# Patient Record
Sex: FEMALE | Race: OTHER | HISPANIC OR LATINO
[De-identification: names, ages, dates, MRNs, and addresses within clinical notes are randomized per-mention and may not be internally consistent; named-entity substitution may affect disease eponyms.]

---

## 2018-02-21 ENCOUNTER — RESULT REVIEW (OUTPATIENT)
Age: 68
End: 2018-02-21

## 2019-01-28 ENCOUNTER — RESULT REVIEW (OUTPATIENT)
Age: 69
End: 2019-01-28

## 2019-10-16 PROBLEM — Z00.00 ENCOUNTER FOR PREVENTIVE HEALTH EXAMINATION: Status: ACTIVE | Noted: 2019-10-16

## 2019-11-27 ENCOUNTER — OUTPATIENT (OUTPATIENT)
Dept: OUTPATIENT SERVICES | Facility: HOSPITAL | Age: 69
LOS: 1 days | Discharge: HOME | End: 2019-11-27
Payer: MEDICARE

## 2019-11-27 VITALS
OXYGEN SATURATION: 99 % | WEIGHT: 111.33 LBS | SYSTOLIC BLOOD PRESSURE: 136 MMHG | DIASTOLIC BLOOD PRESSURE: 64 MMHG | HEIGHT: 57 IN | RESPIRATION RATE: 17 BRPM | TEMPERATURE: 98 F | HEART RATE: 56 BPM

## 2019-11-27 DIAGNOSIS — Z90.711 ACQUIRED ABSENCE OF UTERUS WITH REMAINING CERVICAL STUMP: Chronic | ICD-10-CM

## 2019-11-27 DIAGNOSIS — Z01.818 ENCOUNTER FOR OTHER PREPROCEDURAL EXAMINATION: ICD-10-CM

## 2019-11-27 DIAGNOSIS — N81.12 CYSTOCELE, LATERAL: Chronic | ICD-10-CM

## 2019-11-27 DIAGNOSIS — M17.11 UNILATERAL PRIMARY OSTEOARTHRITIS, RIGHT KNEE: ICD-10-CM

## 2019-11-27 LAB
ANION GAP SERPL CALC-SCNC: 5 MMOL/L — SIGNIFICANT CHANGE UP (ref 5–17)
APTT BLD: 27.6 SEC — SIGNIFICANT CHANGE UP (ref 27.5–36.3)
BASOPHILS # BLD AUTO: 0.03 K/UL — SIGNIFICANT CHANGE UP (ref 0–0.2)
BASOPHILS NFR BLD AUTO: 0.6 % — SIGNIFICANT CHANGE UP (ref 0–2)
BLD GP AB SCN SERPL QL: SIGNIFICANT CHANGE UP
BUN SERPL-MCNC: 11 MG/DL — SIGNIFICANT CHANGE UP (ref 7–23)
CALCIUM SERPL-MCNC: 9.3 MG/DL — SIGNIFICANT CHANGE UP (ref 8.5–10.1)
CHLORIDE SERPL-SCNC: 107 MMOL/L — SIGNIFICANT CHANGE UP (ref 96–108)
CO2 SERPL-SCNC: 30 MMOL/L — SIGNIFICANT CHANGE UP (ref 22–31)
CREAT SERPL-MCNC: 0.58 MG/DL — SIGNIFICANT CHANGE UP (ref 0.5–1.3)
EOSINOPHIL # BLD AUTO: 0.1 K/UL — SIGNIFICANT CHANGE UP (ref 0–0.5)
EOSINOPHIL NFR BLD AUTO: 2.1 % — SIGNIFICANT CHANGE UP (ref 0–6)
GLUCOSE SERPL-MCNC: 87 MG/DL — SIGNIFICANT CHANGE UP (ref 70–99)
HBA1C BLD-MCNC: 5.6 % — SIGNIFICANT CHANGE UP (ref 4–5.6)
HCT VFR BLD CALC: 37.2 % — SIGNIFICANT CHANGE UP (ref 34.5–45)
HGB BLD-MCNC: 11.8 G/DL — SIGNIFICANT CHANGE UP (ref 11.5–15.5)
IMM GRANULOCYTES NFR BLD AUTO: 0.2 % — SIGNIFICANT CHANGE UP (ref 0–1.5)
INR BLD: 1.03 RATIO — SIGNIFICANT CHANGE UP (ref 0.88–1.16)
LYMPHOCYTES # BLD AUTO: 1.66 K/UL — SIGNIFICANT CHANGE UP (ref 1–3.3)
LYMPHOCYTES # BLD AUTO: 35.2 % — SIGNIFICANT CHANGE UP (ref 13–44)
MCHC RBC-ENTMCNC: 30.1 PG — SIGNIFICANT CHANGE UP (ref 27–34)
MCHC RBC-ENTMCNC: 31.7 GM/DL — LOW (ref 32–36)
MCV RBC AUTO: 94.9 FL — SIGNIFICANT CHANGE UP (ref 80–100)
MONOCYTES # BLD AUTO: 0.36 K/UL — SIGNIFICANT CHANGE UP (ref 0–0.9)
MONOCYTES NFR BLD AUTO: 7.6 % — SIGNIFICANT CHANGE UP (ref 2–14)
NEUTROPHILS # BLD AUTO: 2.56 K/UL — SIGNIFICANT CHANGE UP (ref 1.8–7.4)
NEUTROPHILS NFR BLD AUTO: 54.3 % — SIGNIFICANT CHANGE UP (ref 43–77)
NRBC # BLD: 0 /100 WBCS — SIGNIFICANT CHANGE UP (ref 0–0)
PLATELET # BLD AUTO: 315 K/UL — SIGNIFICANT CHANGE UP (ref 150–400)
POTASSIUM SERPL-MCNC: 4.3 MMOL/L — SIGNIFICANT CHANGE UP (ref 3.5–5.3)
POTASSIUM SERPL-SCNC: 4.3 MMOL/L — SIGNIFICANT CHANGE UP (ref 3.5–5.3)
PROTHROM AB SERPL-ACNC: 11.5 SEC — SIGNIFICANT CHANGE UP (ref 10–12.9)
RBC # BLD: 3.92 M/UL — SIGNIFICANT CHANGE UP (ref 3.8–5.2)
RBC # FLD: 13 % — SIGNIFICANT CHANGE UP (ref 10.3–14.5)
SODIUM SERPL-SCNC: 142 MMOL/L — SIGNIFICANT CHANGE UP (ref 135–145)
WBC # BLD: 4.72 K/UL — SIGNIFICANT CHANGE UP (ref 3.8–10.5)
WBC # FLD AUTO: 4.72 K/UL — SIGNIFICANT CHANGE UP (ref 3.8–10.5)

## 2019-11-27 PROCEDURE — 93010 ELECTROCARDIOGRAM REPORT: CPT

## 2019-11-27 RX ORDER — METOPROLOL TARTRATE 50 MG
1 TABLET ORAL
Qty: 0 | Refills: 0 | DISCHARGE

## 2019-11-27 NOTE — H&P PST ADULT - HISTORY OF PRESENT ILLNESS
history of hypertesnion, prolapse baldder 69 year old with a past medical history of hypertension, prolapse bladder, paravaginal repair and cystoscopy c/o of pain to right knee.  She is scheduled for a right total knee replacement on 12/11/19.     Goal: to be more active

## 2019-11-27 NOTE — H&P PST ADULT - NSANTHOSAYNRD_GEN_A_CORE
No. SEFERINO screening performed.  STOP BANG Legend: 0-2 = LOW Risk; 3-4 = INTERMEDIATE Risk; 5-8 = HIGH Risk

## 2019-11-27 NOTE — PHYSICAL THERAPY INITIAL EVALUATION ADULT - MODIFIED CLINICAL TEST OF SENSORY INTEGRATION IN BALANCE TEST
5x Sit to Stand Test = (no hands use) 18.84 seconds, indicating significant impairment c functional mobility & strength  ; 2 Minute Walk Test = 400 feet without devices or rest stops

## 2019-11-27 NOTE — H&P PST ADULT - NSICDXPASTSURGICALHX_GEN_ALL_CORE_FT
PAST SURGICAL HISTORY:  History of partial hysterectomy PAST SURGICAL HISTORY:  History of partial hysterectomy     Paravaginal cystocele

## 2019-11-27 NOTE — H&P PST ADULT - NSICDXPROBLEM_GEN_ALL_CORE_FT
PROBLEM DIAGNOSES  Problem: Osteoarthritis of right knee  Assessment and Plan: right knee replacement 12/11/19    Problem: Preop examination  Assessment and Plan: labs - cbc,pt/ptt,bmp,t&s,nose cx,ekg  M/C required  preop 3 day hibiclens instruction reviewed and given .instructed on if  nose cx positive use mupuricin 5 days and checklist given  take routine meds DOS with sips of water. avoid NSAID and OTC supplements. verbalized understanding  information on proper nutrition , increase protein and better food choices provided in packet

## 2019-11-27 NOTE — H&P PST ADULT - ASSESSMENT
69 year old with a past medical history of hypertension, prolapse bladder, paravaginal repair and cystoscopy c/o of pain to right knee.  She is scheduled for a right total knee replacement on 19.     CAPRINI SCORE [CLOT]    AGE RELATED RISK FACTORS                                                       MOBILITY RELATED FACTORS  [ ] Age 41-60 years                                            (1 Point)                  [ ] Bed rest                                                        (1 Point)  [x ] Age: 61-74 years                                           (2 Points)                 [ ] Plaster cast                                                   (2 Points)  [ ] Age= 75 years                                              (3 Points)                 [ ] Bed bound for more than 72 hours                 (2 Points)    DISEASE RELATED RISK FACTORS                                               GENDER SPECIFIC FACTORS  [ ] Edema in the lower extremities                       (1 Point)                  [ ] Pregnancy                                                     (1 Point)  [ ] Varicose veins                                               (1 Point)                  [ ] Post-partum < 6 weeks                                   (1 Point)             [ ] BMI > 25 Kg/m2                                            (1 Point)                  [ ] Hormonal therapy  or oral contraception          (1 Point)                 [ ] Sepsis (in the previous month)                        (1 Point)                  [ ] History of pregnancy complications                 (1 point)  [ ] Pneumonia or serious lung disease                                               [ ] Unexplained or recurrent                     (1 Point)           (in the previous month)                               (1 Point)  [ ] Abnormal pulmonary function test                     (1 Point)                 SURGERY RELATED RISK FACTORS  [ ] Acute myocardial infarction                              (1 Point)                 [ ]  Section                                             (1 Point)  [ ] Congestive heart failure (in the previous month)  (1 Point)               [ ] Minor surgery                                                  (1 Point)   [ ] Inflammatory bowel disease                             (1 Point)                 [ ] Arthroscopic surgery                                        (2 Points)  [ ] Central venous access                                      (2 Points)                [ ] General surgery lasting more than 45 minutes   (2 Points)       [ ] Stroke (in the previous month)                          (5 Points)               [ x] Elective arthroplasty                                         (5 Points)                                                                                                                                               HEMATOLOGY RELATED FACTORS                                                 TRAUMA RELATED RISK FACTORS  [ ] Prior episodes of VTE                                     (3 Points)                [ ] Fracture of the hip, pelvis, or leg                       (5 Points)  [ ] Positive family history for VTE                         (3 Points)                 [ ] Acute spinal cord injury (in the previous month)  (5 Points)  [ ] Prothrombin 90346 A                                     (3 Points)                 [ ] Paralysis  (less than 1 month)                             (5 Points)  [ ] Factor V Leiden                                             (3 Points)                  [ ] Multiple Trauma within 1 month                        (5 Points)  [ ] Lupus anticoagulants                                     (3 Points)                                                           [ ] Anticardiolipin antibodies                               (3 Points)                                                       [ ] High homocysteine in the blood                      (3 Points)                                             [ ] Other congenital or acquired thrombophilia      (3 Points)                                                [ ] Heparin induced thrombocytopenia                  (3 Points)                                          Total Score [      7    ]    Caprini Score 0 - 2:  Low Risk, No VTE Prophylaxis required for most patients, encourage ambulation  Caprini Score 3 - 6:  At Risk, pharmacologic VTE prophylaxis is indicated for most patients (in the absence of a contraindication)  Caprini Score Greater than or = 7:  High Risk, pharmacologic VTE prophylaxis is indicated for most patients (in the absence of a contraindication)

## 2019-11-27 NOTE — PHYSICAL THERAPY INITIAL EVALUATION ADULT - ADDITIONAL COMMENTS
Pt lives in a private home with 5 steps to enter with B handrails that are wide apart. Once inside there are 5 more steps to the main level with 1 handrail to right to main level. Pt has a walk in shower in his bedroom with grab bars and there is a tub shower guest bathroom also with grab bars. Pt has a fixed shower head, standard height toilet. Pt reports that a 3:1 commode can fit over toilet. (+) buckling, (-) falls. Pt does not have dme. Pain is 0/10 at rest and can increase to 6/10 with static positions, stairs and ambulation. Pt gets relief with ice, does not take pain medications, no recent PT. Pt has had complications  with anesthesia. Pt wears glasses, R hand dominant, drives, no hearing aids.

## 2019-11-28 LAB
MRSA PCR RESULT.: SIGNIFICANT CHANGE UP
S AUREUS DNA NOSE QL NAA+PROBE: SIGNIFICANT CHANGE UP

## 2019-12-26 PROBLEM — N81.10 CYSTOCELE, UNSPECIFIED: Chronic | Status: ACTIVE | Noted: 2019-11-27

## 2019-12-26 PROBLEM — I10 ESSENTIAL (PRIMARY) HYPERTENSION: Chronic | Status: ACTIVE | Noted: 2019-11-27

## 2020-01-06 ENCOUNTER — APPOINTMENT (OUTPATIENT)
Dept: UROGYNECOLOGY | Facility: CLINIC | Age: 70
End: 2020-01-06
Payer: MEDICARE

## 2020-01-06 VITALS
SYSTOLIC BLOOD PRESSURE: 120 MMHG | BODY MASS INDEX: 24.16 KG/M2 | WEIGHT: 112 LBS | DIASTOLIC BLOOD PRESSURE: 60 MMHG | HEIGHT: 57 IN

## 2020-01-06 DIAGNOSIS — N93.9 ABNORMAL UTERINE AND VAGINAL BLEEDING, UNSPECIFIED: ICD-10-CM

## 2020-01-06 PROCEDURE — 99204 OFFICE O/P NEW MOD 45 MIN: CPT

## 2020-01-06 NOTE — LETTER BODY
[Dear  ___] : Dear  [unfilled], [DrDallas  ___] : Dr. ERAZO  [Thank you very much for allowing me to participate in the care of this patient. If you have any questions, please do not hesitate to contact me] : Thank you very much for allowing me to participate in the care of this patient. If you have any questions, please do not hesitate to contact me. [Attached please find my note.] : Attached please find my note.

## 2020-09-15 ENCOUNTER — APPOINTMENT (OUTPATIENT)
Dept: UROGYNECOLOGY | Facility: CLINIC | Age: 70
End: 2020-09-15
Payer: MEDICARE

## 2020-09-15 VITALS — HEART RATE: 77 BPM | SYSTOLIC BLOOD PRESSURE: 116 MMHG | DIASTOLIC BLOOD PRESSURE: 67 MMHG | TEMPERATURE: 98.8 F

## 2020-09-15 DIAGNOSIS — Z87.898 PERSONAL HISTORY OF OTHER SPECIFIED CONDITIONS: ICD-10-CM

## 2020-09-15 LAB
BILIRUB UR QL STRIP: NORMAL
CLARITY UR: CLEAR
COLLECTION METHOD: NORMAL
GLUCOSE UR-MCNC: NORMAL
HCG UR QL: 0.2 EU/DL
HGB UR QL STRIP.AUTO: NORMAL
KETONES UR-MCNC: NORMAL
LEUKOCYTE ESTERASE UR QL STRIP: NORMAL
NITRITE UR QL STRIP: NORMAL
PH UR STRIP: 5
PROT UR STRIP-MCNC: NORMAL
SP GR UR STRIP: 1.01

## 2020-09-15 PROCEDURE — 99213 OFFICE O/P EST LOW 20 MIN: CPT

## 2020-09-15 RX ORDER — LEVOFLOXACIN 500 MG/1
500 TABLET, FILM COATED ORAL DAILY
Qty: 7 | Refills: 0 | Status: ACTIVE | COMMUNITY
Start: 2020-09-15 | End: 1900-01-01

## 2020-09-15 RX ORDER — AMOXICILLIN AND CLAVULANATE POTASSIUM 500; 125 MG/1; MG/1
500-125 TABLET, FILM COATED ORAL
Qty: 20 | Refills: 0 | Status: DISCONTINUED | COMMUNITY
Start: 2020-09-15 | End: 2020-09-15

## 2020-09-15 NOTE — HISTORY OF PRESENT ILLNESS
[FreeTextEntry1] : Patient with complicated urogyn history. At present has persistent vaginal bleeding with denuded mucosal surfaces in absence of detectable vaginal erosions.   Surgical history includes (all Dr Adkins)\par \par  2010: supracervical hysterectomy, abdominal sacral suspension\par \par 5/16/2013:  Laparotomy: resection of sacral suspension mesh: erosion posterior and at apex noted. New AMS prolene graft placed anterior wall only ' away from repaired colpotomy - sewn with 2-0 prolene- sacral attachment with pro-brianna. \par \par 7/11/2019:  Closure of vaginal apex for bleeding/ denuded surface of apical mucosa, posterior repair. Findings were denuded surface of vaginal epithelium without evidence of erosion of previous graft.\par \par 10/22/19 : Graft augmented neovagina anterior and posterior defects grafted with dermapure.  Posterior was most of posterior length\par \par Moderate persistent vaginal bleeding is chief complaint.  Bopsies at time of  most recent surgery - benign. \par \par Examination the external genitalia are normal. Introitus is snug from the muscular constriction viewpoint. She tolerate speculum examination of opening the speculum there is denuded vaginal mucosa present for the upper half of the vagina.  It is friable and bleeds easily with touch. By palpation and visualization. I cannot appreciate any permanent suture or graft material.  With a swab I expelled a small clot. i do not appreciate any foreign body or lesion. \par \par \par My impression is that this patient has a chronic inflammatory response following several surgeries and at present appears to have denuded a good fraction of the vaginal mucosa.   \par \par The options include, exam under anesthesia, CO2 laser, and neovagina with autologous graft.    I have asked her to have a consult with Lashaun Wright in [place].  If Co2 is not an option then a specialty center for karlene-vagina's would be my recommendation.  It may be prudent regardless of next proposed step to have an EUA to verify there is no foreign material in the vaginal lumen.

## 2020-09-15 NOTE — HISTORY OF PRESENT ILLNESS
[FreeTextEntry1] : This is a 70-year-old woman with a complicated urogynecologic history. Please see January 2020 initial note. She has had multiple reconstructive surgeries. Some with a synthetic mesh and some with a Dermapure. She has chronic mild vaginal bleeding, and denudation of the vaginal mucosa.  She does not appear clinically to have recurrent infections.  Previous referral was made to Lashaun Wright and consideration of referral for neovagina if symptoms dictated.  EUA is a consideration\par \par She has hesitancy and UTI symptoms\par \par Sterile straight catheterization was performed to measure a postvoid residual volume which was 30 cc\par Urine has trace blood only. \par \par Examination has shown genitalia are normal. There's no evidence of prolapse. His denudation of the vaginal mucosa for the upper half. I cannot palpate nor see foreign body. However, there is discharge in the tissue was friable. There was bleeding with touch. The bleeding stopped with pressure. I cultured what appeared to be a discharge.\par \par Then, my impression that she is infection of micro-particle of perhaps material used for vaginal suspension.  I'm recommending Augmentin for 10 days and stop the medication, pending results of the culture but she will started based on clinical symptoms and the appearance of infection today.  \par \par She has had a telehealth consult with Dr. Wright and is scheduled for an in person visit to see whether possible laser treatment of the vagina as an option.  Certainly, minimizing under eliminating, infection, prior to that visit, and extents. The patient will call me after the Augmentin when her symptoms changed at all.\par \par I have counseled at managing this with antibiotics and accepting  some vaginal staining is a risk level far lower than extensive surgery to attempt to remove all foreign material.  that procedure also has risk of recurrent prolapse. \par \par

## 2020-09-22 LAB — BACTERIA GENITAL AEROBE CULT: NORMAL

## 2020-10-02 ENCOUNTER — RESULT REVIEW (OUTPATIENT)
Age: 70
End: 2020-10-02

## 2020-12-23 PROBLEM — Z87.898 HISTORY OF VAGINAL INFECTION: Status: RESOLVED | Noted: 2020-09-15 | Resolved: 2020-12-23

## 2022-04-15 ENCOUNTER — APPOINTMENT (OUTPATIENT)
Dept: ORTHOPEDIC SURGERY | Facility: CLINIC | Age: 72
End: 2022-04-15

## 2022-12-16 ENCOUNTER — OFFICE (OUTPATIENT)
Dept: URBAN - METROPOLITAN AREA CLINIC 93 | Facility: CLINIC | Age: 72
Setting detail: OPHTHALMOLOGY
End: 2022-12-16
Payer: MEDICARE

## 2022-12-16 VITALS — BODY MASS INDEX: 21.6 KG/M2 | WEIGHT: 110 LBS | HEIGHT: 60 IN

## 2022-12-16 DIAGNOSIS — H35.033: ICD-10-CM

## 2022-12-16 DIAGNOSIS — H00.11: ICD-10-CM

## 2022-12-16 DIAGNOSIS — H43.393: ICD-10-CM

## 2022-12-16 PROCEDURE — 92014 COMPRE OPH EXAM EST PT 1/>: CPT | Performed by: OPHTHALMOLOGY

## 2022-12-16 PROCEDURE — 92250 FUNDUS PHOTOGRAPHY W/I&R: CPT | Performed by: OPHTHALMOLOGY

## 2022-12-16 ASSESSMENT — REFRACTION_CURRENTRX
OS_VPRISM_DIRECTION: PROGS
OS_CYLINDER: -1.25
OD_CYLINDER: -0.75
OS_ADD: +2.50
OD_ADD: +2.50
OD_AXIS: 132
OS_SPHERE: +2.25
OS_OVR_VA: 20/
OD_OVR_VA: 20/
OD_VPRISM_DIRECTION: PROGS
OD_SPHERE: +1.75
OS_AXIS: 75

## 2022-12-16 ASSESSMENT — REFRACTION_MANIFEST
OS_SPHERE: +1.25
OS_AXIS: 58
OD_CYLINDER: -1.00
OD_ADD: +2.75
OS_ADD: +2.50
OD_SPHERE: +0.75
OD_VA1: 20/20
OS_CYLINDER: -1.00
OS_AXIS: 65
OS_VA1: 20/20
OD_AXIS: 150
OD_CYLINDER: -0.75
OD_SPHERE: +1.00
OS_CYLINDER: -1.25
OD_ADD: +2.50
OS_VA1: 20/25
OD_VA1: 20/20
OS_SPHERE: +1.00
OS_ADD: +2.75
OD_AXIS: 120

## 2022-12-16 ASSESSMENT — SPHEQUIV_DERIVED
OS_SPHEQUIV: 0.625
OD_SPHEQUIV: 0.375
OS_SPHEQUIV: 0.5
OD_SPHEQUIV: 0.5
OD_SPHEQUIV: 1.125
OS_SPHEQUIV: 1.125

## 2022-12-16 ASSESSMENT — KERATOMETRY
OD_K1POWER_DIOPTERS: 43.00
OS_K2POWER_DIOPTERS: 43.50
OD_AXISANGLE_DEGREES: 126
OS_K1POWER_DIOPTERS: 43.00
OD_K2POWER_DIOPTERS: 42.50
OS_AXISANGLE_DEGREES: 127

## 2022-12-16 ASSESSMENT — REFRACTION_AUTOREFRACTION
OS_SPHERE: +2.00
OS_CYLINDER: -1.75
OD_SPHERE: +1.75
OD_CYLINDER: -1.25
OS_AXIS: 63
OD_AXIS: 95

## 2022-12-16 ASSESSMENT — AXIALLENGTH_DERIVED
OS_AL: 23.2504
OD_AL: 23.6729
OD_AL: 23.4302
OS_AL: 23.4412
OS_AL: 23.4894
OD_AL: 23.722

## 2022-12-16 ASSESSMENT — VISUAL ACUITY
OS_BCVA: 20/30
OD_BCVA: 20/30

## 2022-12-16 ASSESSMENT — CONFRONTATIONAL VISUAL FIELD TEST (CVF)
OD_FINDINGS: FULL
OS_FINDINGS: FULL

## 2023-06-15 ENCOUNTER — OFFICE (OUTPATIENT)
Facility: LOCATION | Age: 73
Setting detail: OPHTHALMOLOGY
End: 2023-06-15
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
## 2023-06-15 PROCEDURE — NO SHOW FE NO SHOW FEE: Performed by: OPHTHALMOLOGY

## 2023-06-19 ENCOUNTER — APPOINTMENT (OUTPATIENT)
Dept: ORTHOPEDIC SURGERY | Facility: CLINIC | Age: 73
End: 2023-06-19

## 2023-07-18 ENCOUNTER — APPOINTMENT (OUTPATIENT)
Dept: ORTHOPEDIC SURGERY | Facility: CLINIC | Age: 73
End: 2023-07-18
Payer: MEDICARE

## 2023-07-18 VITALS — HEIGHT: 57 IN

## 2023-07-18 DIAGNOSIS — I10 ESSENTIAL (PRIMARY) HYPERTENSION: ICD-10-CM

## 2023-07-18 PROCEDURE — 73562 X-RAY EXAM OF KNEE 3: CPT | Mod: RT

## 2023-07-18 PROCEDURE — 99214 OFFICE O/P EST MOD 30 MIN: CPT

## 2023-07-18 NOTE — IMAGING
[de-identified] : RIGHT KNEE\par -2 and flexes to 100\par varus deformity only partially correctable\par good pulse\par no edema\par medial joint line tenderness\par lateral joint line tenderness

## 2023-07-18 NOTE — ASSESSMENT
[FreeTextEntry1] : 7/18/23: Adv bone on bone arthritis. Has tried and failed all forms of conservative tx. Delayed surgery in the past due to other surgeries/family issues but is ready to proceed with R TKA with ALLEGRA- risks and benefits explained.

## 2023-07-18 NOTE — HISTORY OF PRESENT ILLNESS
[8] : 8 [6] : 6 [Dull/Aching] : dull/aching [de-identified] : 7/18/23: Here to f/up RT Knee pain. Pain worsens with prolonged sitting and going from sit to stand. Not taking any medications for pain. Recieved csi inj in the past 2x, only provided relief the first time. Wears knee brace occasionally when pain is very bad. Had R TKA scheduled pre-COVID but was cancelled. Pt is interested in discussing TKA today.  [FreeTextEntry5] : pain in the right knee has gotten worse was supposed to get sx but was postponed pain worse walking, sitting, stairs

## 2023-07-23 ENCOUNTER — FORM ENCOUNTER (OUTPATIENT)
Age: 73
End: 2023-07-23

## 2023-07-24 ENCOUNTER — APPOINTMENT (OUTPATIENT)
Dept: CT IMAGING | Facility: CLINIC | Age: 73
End: 2023-07-24
Payer: MEDICARE

## 2023-07-24 PROCEDURE — 76376 3D RENDER W/INTRP POSTPROCES: CPT | Mod: RT

## 2023-07-24 PROCEDURE — 73700 CT LOWER EXTREMITY W/O DYE: CPT | Mod: RT

## 2023-08-11 ENCOUNTER — NON-APPOINTMENT (OUTPATIENT)
Age: 73
End: 2023-08-11

## 2023-09-01 ENCOUNTER — OUTPATIENT (OUTPATIENT)
Dept: OUTPATIENT SERVICES | Facility: HOSPITAL | Age: 73
LOS: 1 days | Discharge: HOME | End: 2023-09-01
Payer: MEDICARE

## 2023-09-01 VITALS
RESPIRATION RATE: 17 BRPM | OXYGEN SATURATION: 99 % | SYSTOLIC BLOOD PRESSURE: 136 MMHG | DIASTOLIC BLOOD PRESSURE: 60 MMHG | HEART RATE: 69 BPM | HEIGHT: 57 IN | TEMPERATURE: 98 F | WEIGHT: 110.23 LBS

## 2023-09-01 DIAGNOSIS — M25.561 PAIN IN RIGHT KNEE: ICD-10-CM

## 2023-09-01 DIAGNOSIS — M17.11 UNILATERAL PRIMARY OSTEOARTHRITIS, RIGHT KNEE: ICD-10-CM

## 2023-09-01 DIAGNOSIS — I10 ESSENTIAL (PRIMARY) HYPERTENSION: ICD-10-CM

## 2023-09-01 DIAGNOSIS — N81.12 CYSTOCELE, LATERAL: Chronic | ICD-10-CM

## 2023-09-01 DIAGNOSIS — Z01.818 ENCOUNTER FOR OTHER PREPROCEDURAL EXAMINATION: ICD-10-CM

## 2023-09-01 DIAGNOSIS — Z90.711 ACQUIRED ABSENCE OF UTERUS WITH REMAINING CERVICAL STUMP: Chronic | ICD-10-CM

## 2023-09-01 LAB
A1C WITH ESTIMATED AVERAGE GLUCOSE RESULT: 5.4 % — SIGNIFICANT CHANGE UP (ref 4–5.6)
ALBUMIN SERPL ELPH-MCNC: 3.9 G/DL — SIGNIFICANT CHANGE UP (ref 3.3–5)
ALP SERPL-CCNC: 79 U/L — SIGNIFICANT CHANGE UP (ref 40–120)
ALT FLD-CCNC: 16 U/L — SIGNIFICANT CHANGE UP (ref 12–78)
ANION GAP SERPL CALC-SCNC: 4 MMOL/L — LOW (ref 5–17)
APTT BLD: 28.6 SEC — SIGNIFICANT CHANGE UP (ref 24.5–35.6)
AST SERPL-CCNC: 11 U/L — LOW (ref 15–37)
BILIRUB SERPL-MCNC: 0.4 MG/DL — SIGNIFICANT CHANGE UP (ref 0.2–1.2)
BLD GP AB SCN SERPL QL: SIGNIFICANT CHANGE UP
BUN SERPL-MCNC: 14 MG/DL — SIGNIFICANT CHANGE UP (ref 7–23)
CALCIUM SERPL-MCNC: 9.3 MG/DL — SIGNIFICANT CHANGE UP (ref 8.5–10.1)
CHLORIDE SERPL-SCNC: 110 MMOL/L — HIGH (ref 96–108)
CO2 SERPL-SCNC: 28 MMOL/L — SIGNIFICANT CHANGE UP (ref 22–31)
CREAT SERPL-MCNC: 0.56 MG/DL — SIGNIFICANT CHANGE UP (ref 0.5–1.3)
EGFR: 96 ML/MIN/1.73M2 — SIGNIFICANT CHANGE UP
ESTIMATED AVERAGE GLUCOSE: 108 MG/DL — SIGNIFICANT CHANGE UP (ref 68–114)
GLUCOSE SERPL-MCNC: 94 MG/DL — SIGNIFICANT CHANGE UP (ref 70–99)
HCT VFR BLD CALC: 38.4 % — SIGNIFICANT CHANGE UP (ref 34.5–45)
HGB BLD-MCNC: 12.6 G/DL — SIGNIFICANT CHANGE UP (ref 11.5–15.5)
INR BLD: 0.96 RATIO — SIGNIFICANT CHANGE UP (ref 0.85–1.18)
MCHC RBC-ENTMCNC: 30.4 PG — SIGNIFICANT CHANGE UP (ref 27–34)
MCHC RBC-ENTMCNC: 32.8 G/DL — SIGNIFICANT CHANGE UP (ref 32–36)
MCV RBC AUTO: 92.8 FL — SIGNIFICANT CHANGE UP (ref 80–100)
MRSA PCR RESULT.: SIGNIFICANT CHANGE UP
NRBC # BLD: 0 /100 WBCS — SIGNIFICANT CHANGE UP (ref 0–0)
PLATELET # BLD AUTO: 308 K/UL — SIGNIFICANT CHANGE UP (ref 150–400)
POTASSIUM SERPL-MCNC: 4.4 MMOL/L — SIGNIFICANT CHANGE UP (ref 3.5–5.3)
POTASSIUM SERPL-SCNC: 4.4 MMOL/L — SIGNIFICANT CHANGE UP (ref 3.5–5.3)
PROT SERPL-MCNC: 7.3 GM/DL — SIGNIFICANT CHANGE UP (ref 6–8.3)
PROTHROM AB SERPL-ACNC: 11.5 SEC — SIGNIFICANT CHANGE UP (ref 9.5–13)
RBC # BLD: 4.14 M/UL — SIGNIFICANT CHANGE UP (ref 3.8–5.2)
RBC # FLD: 12.8 % — SIGNIFICANT CHANGE UP (ref 10.3–14.5)
S AUREUS DNA NOSE QL NAA+PROBE: SIGNIFICANT CHANGE UP
SODIUM SERPL-SCNC: 142 MMOL/L — SIGNIFICANT CHANGE UP (ref 135–145)
WBC # BLD: 4.43 K/UL — SIGNIFICANT CHANGE UP (ref 3.8–10.5)
WBC # FLD AUTO: 4.43 K/UL — SIGNIFICANT CHANGE UP (ref 3.8–10.5)

## 2023-09-01 PROCEDURE — 93010 ELECTROCARDIOGRAM REPORT: CPT

## 2023-09-01 NOTE — PHYSICAL THERAPY INITIAL EVALUATION ADULT - ADDITIONAL COMMENTS
Pt lives alone (her son will assist when available after pt d/c to home) in a private home, 5 entry steps c B/L rails (far apart), 2 level inside home, 5 steps to the second floor with rails on the right. Pt has a walk in shower stall with a fixed shower head, standard toilet seat height, & + grab bars. Pt states she is currently independent with all functional mobility including community ambulation without an assistive device. (both in good working condition & easily accessible). Pt states she is independent with ADL's as well. Pt reports daily 0/10 pain at rest & states it is worse with any activity 7/10. Pt is right hand dominant, wears eye glasses, drives, & is retired. Pt reports she has the most difficulty time "getting up & moving around" after prolonged sitting. Pt denies taking narcotics for pain management. Goal of therapy: manage pain & improve functional mobility. Pt lives alone (her son will assist when available after pt d/c to home) in a private home, 5 entry steps c B/L rails (far apart), 2 level inside home, 5 steps to the second floor with rails on the right. Pt has a walk in shower stall with a fixed shower head, standard toilet seat height, & + grab bars. Pt states she is currently independent with all functional mobility including community ambulation without an assistive device. Pt states she is independent with ADL's as well. Pt reports daily 0/10 pain at rest & states it is worse with any activity 7/10. Pt is right hand dominant, wears eye glasses, drives, & is retired. Pt reports she has the most difficulty time "getting up & moving around" after prolonged sitting. Pt denies taking narcotics for pain management. Goal of therapy: manage pain & improve functional mobility.

## 2023-09-01 NOTE — H&P PST ADULT - ASSESSMENT
Right knee arthritis for right total knee replacement  CAPRINI SCORE [CLOT]    AGE RELATED RISK FACTORS                                                       MOBILITY RELATED FACTORS  [ ] Age 41-60 years                                            (1 Point)                  [ ] Bed rest                                                        (1 Point)  [ ] Age: 61-74 years                                           (2 Points)                 [ ] Plaster cast                                                   (2 Points)  [ ] Age= 75 years                                              (3 Points)                 [ ] Bed bound for more than 72 hours                 (2 Points)    DISEASE RELATED RISK FACTORS                                               GENDER SPECIFIC FACTORS  [ ] Edema in the lower extremities                       (1 Point)                  [ ] Pregnancy                                                     (1 Point)  [ ] Varicose veins                                               (1 Point)                  [ ] Post-partum < 6 weeks                                   (1 Point)             [ ] BMI > 25 Kg/m2                                            (1 Point)                  [ ] Hormonal therapy  or oral contraception          (1 Point)                 [ ] Sepsis (in the previous month)                        (1 Point)                  [ ] History of pregnancy complications                 (1 point)  [ ] Pneumonia or serious lung disease                                               [ ] Unexplained or recurrent                     (1 Point)           (in the previous month)                               (1 Point)  [ ] Abnormal pulmonary function test                     (1 Point)                 SURGERY RELATED RISK FACTORS  [ ] Acute myocardial infarction                              (1 Point)                 [ ]  Section                                             (1 Point)  [ ] Congestive heart failure (in the previous month)  (1 Point)               [ ] Minor surgery                                                  (1 Point)   [ ] Inflammatory bowel disease                             (1 Point)                 [ ] Arthroscopic surgery                                        (2 Points)  [ ] Central venous access                                      (2 Points)                [ ] General surgery lasting more than 45 minutes   (2 Points)       [ ] Stroke (in the previous month)                          (5 Points)               [ ] Elective arthroplasty                                         (5 Points)                                                                                                                                               HEMATOLOGY RELATED FACTORS                                                 TRAUMA RELATED RISK FACTORS  [ ] Prior episodes of VTE                                     (3 Points)                [ ] Fracture of the hip, pelvis, or leg                       (5 Points)  [ ] Positive family history for VTE                         (3 Points)                 [ ] Acute spinal cord injury (in the previous month)  (5 Points)  [ ] Prothrombin 00025 A                                     (3 Points)                 [ ] Paralysis  (less than 1 month)                             (5 Points)  [ ] Factor V Leiden                                             (3 Points)                  [ ] Multiple Trauma within 1 month                        (5 Points)  [ ] Lupus anticoagulants                                     (3 Points)                                                           [ ] Anticardiolipin antibodies                               (3 Points)                                                       [ ] High homocysteine in the blood                      (3 Points)                                             [ ] Other congenital or acquired thrombophilia      (3 Points)                                                [ ] Heparin induced thrombocytopenia                  (3 Points)                                          Total Score [     7     ]    Caprini Score 0 - 2:  Low Risk, No VTE Prophylaxis required for most patients, encourage ambulation  Caprini Score 3 - 6:  At Risk, pharmacologic VTE prophylaxis is indicated for most patients (in the absence of a contraindication)  Caprini Score Greater than or = 7:  High Risk, pharmacologic VTE prophylaxis is indicated for most patients (in the absence of a contraindication) Right knee arthritis for right total knee replacement  CAPRINI SCORE [CLOT]    AGE RELATED RISK FACTORS                                                       MOBILITY RELATED FACTORS  [ ] Age 41-60 years                                            (1 Point)                  [ ] Bed rest                                                        (1 Point)  [x ] Age: 61-74 years                                           (2 Points)                 [ ] Plaster cast                                                   (2 Points)  [ ] Age= 75 years                                              (3 Points)                 [ ] Bed bound for more than 72 hours                 (2 Points)    DISEASE RELATED RISK FACTORS                                               GENDER SPECIFIC FACTORS  [ ] Edema in the lower extremities                       (1 Point)                  [ ] Pregnancy                                                     (1 Point)  [ ] Varicose veins                                               (1 Point)                  [ ] Post-partum < 6 weeks                                   (1 Point)             [ ] BMI > 25 Kg/m2                                            (1 Point)                  [ ] Hormonal therapy  or oral contraception          (1 Point)                 [ ] Sepsis (in the previous month)                        (1 Point)                  [ ] History of pregnancy complications                 (1 point)  [ ] Pneumonia or serious lung disease                                               [ ] Unexplained or recurrent                     (1 Point)           (in the previous month)                               (1 Point)  [ ] Abnormal pulmonary function test                     (1 Point)                 SURGERY RELATED RISK FACTORS  [ ] Acute myocardial infarction                              (1 Point)                 [ ]  Section                                             (1 Point)  [ ] Congestive heart failure (in the previous month)  (1 Point)               [ ] Minor surgery                                                  (1 Point)   [ ] Inflammatory bowel disease                             (1 Point)                 [ ] Arthroscopic surgery                                        (2 Points)  [ ] Central venous access                                      (2 Points)                [ ] General surgery lasting more than 45 minutes   (2 Points)       [ ] Stroke (in the previous month)                          (5 Points)               [ x] Elective arthroplasty                                         (5 Points)                                                                                                                                               HEMATOLOGY RELATED FACTORS                                                 TRAUMA RELATED RISK FACTORS  [ ] Prior episodes of VTE                                     (3 Points)                [ ] Fracture of the hip, pelvis, or leg                       (5 Points)  [ ] Positive family history for VTE                         (3 Points)                 [ ] Acute spinal cord injury (in the previous month)  (5 Points)  [ ] Prothrombin 70549 A                                     (3 Points)                 [ ] Paralysis  (less than 1 month)                             (5 Points)  [ ] Factor V Leiden                                             (3 Points)                  [ ] Multiple Trauma within 1 month                        (5 Points)  [ ] Lupus anticoagulants                                     (3 Points)                                                           [ ] Anticardiolipin antibodies                               (3 Points)                                                       [ ] High homocysteine in the blood                      (3 Points)                                             [ ] Other congenital or acquired thrombophilia      (3 Points)                                                [ ] Heparin induced thrombocytopenia                  (3 Points)                                          Total Score [     7     ]    Caprini Score 0 - 2:  Low Risk, No VTE Prophylaxis required for most patients, encourage ambulation  Caprini Score 3 - 6:  At Risk, pharmacologic VTE prophylaxis is indicated for most patients (in the absence of a contraindication)  Caprini Score Greater than or = 7:  High Risk, pharmacologic VTE prophylaxis is indicated for most patients (in the absence of a contraindication)

## 2023-09-01 NOTE — PHYSICAL THERAPY INITIAL EVALUATION ADULT - RANGE OF MOTION EXAMINATION, REHAB EVAL
Except R knee 0-115/bilateral lower extremity was ROM was WNL (within normal limits)/bilateral lower extremity ROM was WFL (within functional limits) Except R knee flexion 0-115/bilateral lower extremity was ROM was WNL (within normal limits)/bilateral lower extremity ROM was WFL (within functional limits)

## 2023-09-01 NOTE — H&P PST ADULT - PROBLEM SELECTOR PLAN 1
labs - cbc, pt/ptt, cmp, t&s, nose cx, ekg, Vitamin d, hemoglobin a1c     M/C required and cardiac     preop 3 day hibiclens instruction reviewed and given. instructed on if nose cx positive use mupirocin 5 days and checklist given   take routine meds DOS with sips of water. avoid NSAID and OTC supplements. verbalized understanding   information on proper nutrition, increase protein and better food choices provided in packet   ensure clear   anesthesiologist to review pst labs, ekg, medical clearances and optimization for surgery labs - cbc, pt/ptt, cmp, t&s, nose cx, ekg, Vitamin d, hemoglobin a1c     M/C required     preop 3 day hibiclens instruction reviewed and given. instructed on if nose cx positive use mupirocin 5 days and checklist given   take routine meds DOS with sips of water. avoid NSAID and OTC supplements. verbalized understanding   information on proper nutrition, increase protein and better food choices provided in packet   ensure clear   anesthesiologist to review pst labs, ekg, medical clearances and optimization for surgery

## 2023-09-01 NOTE — PHYSICAL THERAPY INITIAL EVALUATION ADULT - PERTINENT HX OF CURRENT PROBLEM, REHAB EVAL
Patient attends pre-op testing today following consult c Dr. Henderson due to chronic pain to right knee. Elective R TKA is now scheduled in this facility for 9/20/23.

## 2023-09-01 NOTE — H&P PST ADULT - HISTORY OF PRESENT ILLNESS
73 year old female presents to PST. Patient has a PMHX of htn prolapsed bladder. Patient has Right knee pain 2/2 Right knee Arthritis and has a planned Right total knee replacement with Dr. Henderson on 9/20/23.    Goal is to be pain free and get back to all activities.     Patient denies any recent travel, cough, fever, chills or recent sick contacts.        73 year old female presents to PST. Patient has a PMHX of htn and prolapsed bladder. Patient has Right knee pain 2/2 Right knee Arthritis and has a planned Right total knee replacement with Dr. Henderson on 9/20/23.    Goal is to be pain free and get back to all activities.     Patient denies any recent travel, cough, fever, chills or recent sick contacts.

## 2023-09-02 LAB — VIT D25+D1,25 OH+D1,25 PNL SERPL-MCNC: 73.8 PG/ML — SIGNIFICANT CHANGE UP (ref 19.9–79.3)

## 2023-09-07 ENCOUNTER — NON-APPOINTMENT (OUTPATIENT)
Age: 73
End: 2023-09-07

## 2023-09-20 ENCOUNTER — APPOINTMENT (OUTPATIENT)
Dept: ORTHOPEDIC SURGERY | Facility: HOSPITAL | Age: 73
End: 2023-09-20

## 2023-10-03 ENCOUNTER — APPOINTMENT (OUTPATIENT)
Dept: ORTHOPEDIC SURGERY | Facility: CLINIC | Age: 73
End: 2023-10-03

## 2023-10-31 ENCOUNTER — APPOINTMENT (OUTPATIENT)
Dept: ORTHOPEDIC SURGERY | Facility: CLINIC | Age: 73
End: 2023-10-31
Payer: MEDICARE

## 2023-10-31 VITALS — HEIGHT: 57 IN | WEIGHT: 112 LBS | BODY MASS INDEX: 24.16 KG/M2

## 2023-10-31 PROCEDURE — 99214 OFFICE O/P EST MOD 30 MIN: CPT

## 2023-12-04 ENCOUNTER — OFFICE (OUTPATIENT)
Facility: LOCATION | Age: 73
Setting detail: OPHTHALMOLOGY
End: 2023-12-04
Payer: MEDICARE

## 2023-12-04 DIAGNOSIS — H43.393: ICD-10-CM

## 2023-12-04 DIAGNOSIS — H25.12: ICD-10-CM

## 2023-12-04 DIAGNOSIS — H25.13: ICD-10-CM

## 2023-12-04 DIAGNOSIS — H53.433: ICD-10-CM

## 2023-12-04 PROBLEM — H25.11 CATARACT SENILE NUCLEAR SCLEROSIS; RIGHT EYE, LEFT EYE, BOTH EYES: Status: ACTIVE | Noted: 2023-12-04

## 2023-12-04 PROCEDURE — 92133 CPTRZD OPH DX IMG PST SGM ON: CPT | Performed by: OPHTHALMOLOGY

## 2023-12-04 PROCEDURE — 92136 OPHTHALMIC BIOMETRY: CPT | Mod: LT | Performed by: OPHTHALMOLOGY

## 2023-12-04 PROCEDURE — 92014 COMPRE OPH EXAM EST PT 1/>: CPT | Performed by: OPHTHALMOLOGY

## 2023-12-04 ASSESSMENT — REFRACTION_AUTOREFRACTION
OD_AXIS: 106
OS_CYLINDER: -1.50
OS_AXIS: 067
OD_CYLINDER: -1.50
OD_SPHERE: +1.50
OS_SPHERE: +1.25

## 2023-12-04 ASSESSMENT — SPHEQUIV_DERIVED
OS_SPHEQUIV: 0.5
OD_SPHEQUIV: 0.5
OS_SPHEQUIV: 0.625
OD_SPHEQUIV: 0.75
OS_SPHEQUIV: 0.5
OD_SPHEQUIV: 0.375

## 2023-12-04 ASSESSMENT — REFRACTION_MANIFEST
OS_SPHERE: +1.00
OD_AXIS: 150
OS_VA1: 20/20
OD_CYLINDER: -0.75
OD_ADD: +2.75
OS_ADD: +2.75
OD_AXIS: 120
OD_SPHERE: +1.00
OD_SPHERE: +0.75
OS_SPHERE: +1.25
OS_VA1: 20/25
OD_VA1: 20/20
OD_VA1: 20/20
OS_CYLINDER: -1.00
OS_ADD: +2.50
OS_AXIS: 65
OS_CYLINDER: -1.25
OS_AXIS: 58
OD_CYLINDER: -1.00
OD_ADD: +2.50

## 2023-12-04 ASSESSMENT — REFRACTION_CURRENTRX
OS_AXIS: 75
OD_VPRISM_DIRECTION: PROGS
OS_VPRISM_DIRECTION: PROGS
OD_CYLINDER: -0.75
OS_SPHERE: +2.25
OS_CYLINDER: -1.25
OD_AXIS: 132
OD_OVR_VA: 20/
OS_ADD: +2.50
OS_OVR_VA: 20/
OD_SPHERE: +1.75
OD_ADD: +2.50

## 2023-12-04 ASSESSMENT — CONFRONTATIONAL VISUAL FIELD TEST (CVF)
OS_FINDINGS: FULL
OD_FINDINGS: FULL

## 2024-01-19 ENCOUNTER — OFFICE (OUTPATIENT)
Facility: LOCATION | Age: 74
Setting detail: OPHTHALMOLOGY
End: 2024-01-19
Payer: MEDICARE

## 2024-01-19 DIAGNOSIS — H00.031: ICD-10-CM

## 2024-01-19 PROCEDURE — 10061 I&D ABSCESS COMP/MULTIPLE: CPT | Mod: RT | Performed by: OPHTHALMOLOGY

## 2024-01-19 PROCEDURE — 92285 EXTERNAL OCULAR PHOTOGRAPHY: CPT | Performed by: OPHTHALMOLOGY

## 2024-01-19 ASSESSMENT — REFRACTION_MANIFEST
OS_SPHERE: +1.00
OD_CYLINDER: -1.00
OS_VA1: 20/20
OD_VA1: 20/20
OS_CYLINDER: -1.25
OS_CYLINDER: -1.00
OD_VA1: 20/20
OD_AXIS: 120
OS_VA1: 20/25
OD_ADD: +2.75
OD_AXIS: 150
OD_SPHERE: +1.00
OS_AXIS: 58
OS_ADD: +2.75
OD_SPHERE: +0.75
OD_CYLINDER: -0.75
OS_ADD: +2.50
OS_AXIS: 65
OD_ADD: +2.50
OS_SPHERE: +1.25

## 2024-01-19 ASSESSMENT — SPHEQUIV_DERIVED
OD_SPHEQUIV: 0.5
OS_SPHEQUIV: 0.5
OS_SPHEQUIV: 0.625
OS_SPHEQUIV: 0.5
OD_SPHEQUIV: 0.75
OD_SPHEQUIV: 0.375

## 2024-01-19 ASSESSMENT — REFRACTION_AUTOREFRACTION
OD_CYLINDER: -1.50
OS_SPHERE: +1.25
OD_AXIS: 106
OS_CYLINDER: -1.50
OD_SPHERE: +1.50
OS_AXIS: 067

## 2024-01-19 ASSESSMENT — REFRACTION_CURRENTRX
OD_CYLINDER: -0.75
OD_VPRISM_DIRECTION: PROGS
OS_SPHERE: +2.25
OS_AXIS: 75
OS_OVR_VA: 20/
OS_ADD: +2.50
OD_ADD: +2.50
OS_CYLINDER: -1.25
OD_SPHERE: +1.75
OD_OVR_VA: 20/
OD_AXIS: 132
OS_VPRISM_DIRECTION: PROGS

## 2024-01-19 ASSESSMENT — CONFRONTATIONAL VISUAL FIELD TEST (CVF)
OD_FINDINGS: FULL
OS_FINDINGS: FULL

## 2024-01-29 ENCOUNTER — RX ONLY (RX ONLY)
Age: 74
End: 2024-01-29

## 2024-01-29 ENCOUNTER — OFFICE (OUTPATIENT)
Facility: LOCATION | Age: 74
Setting detail: OPHTHALMOLOGY
End: 2024-01-29
Payer: MEDICARE

## 2024-01-29 DIAGNOSIS — H00.031: ICD-10-CM

## 2024-01-29 PROBLEM — H25.11 CATARACT SENILE NUCLEAR SCLEROSIS; RIGHT EYE, LEFT EYE, BOTH EYES: Status: ACTIVE | Noted: 2024-01-19

## 2024-01-29 PROBLEM — H25.12 CATARACT SENILE NUCLEAR SCLEROSIS; RIGHT EYE, LEFT EYE, BOTH EYES: Status: ACTIVE | Noted: 2024-01-19

## 2024-01-29 PROBLEM — H25.13 CATARACT SENILE NUCLEAR SCLEROSIS; RIGHT EYE, LEFT EYE, BOTH EYES: Status: ACTIVE | Noted: 2024-01-19

## 2024-01-29 PROCEDURE — 99213 OFFICE O/P EST LOW 20 MIN: CPT | Performed by: OPHTHALMOLOGY

## 2024-01-29 ASSESSMENT — SPHEQUIV_DERIVED
OD_SPHEQUIV: 0.5
OS_SPHEQUIV: 0.5
OD_SPHEQUIV: 0.75
OD_SPHEQUIV: 0.375
OS_SPHEQUIV: 0.5
OS_SPHEQUIV: 0.625

## 2024-01-29 ASSESSMENT — REFRACTION_MANIFEST
OD_VA1: 20/20
OS_ADD: +2.50
OD_VA1: 20/20
OS_CYLINDER: -1.25
OD_SPHERE: +1.00
OD_AXIS: 150
OD_SPHERE: +0.75
OD_ADD: +2.75
OS_ADD: +2.75
OS_AXIS: 58
OD_ADD: +2.50
OS_VA1: 20/20
OS_CYLINDER: -1.00
OD_CYLINDER: -0.75
OS_AXIS: 65
OS_SPHERE: +1.00
OS_VA1: 20/25
OD_AXIS: 120
OD_CYLINDER: -1.00
OS_SPHERE: +1.25

## 2024-01-29 ASSESSMENT — REFRACTION_CURRENTRX
OS_VPRISM_DIRECTION: PROGS
OS_SPHERE: +2.25
OS_ADD: +2.50
OD_CYLINDER: -0.75
OD_ADD: +2.50
OS_OVR_VA: 20/
OD_SPHERE: +1.75
OS_CYLINDER: -1.25
OD_VPRISM_DIRECTION: PROGS
OD_AXIS: 132
OD_OVR_VA: 20/
OS_AXIS: 75

## 2024-01-29 ASSESSMENT — REFRACTION_AUTOREFRACTION
OD_AXIS: 106
OS_AXIS: 067
OD_CYLINDER: -1.50
OS_CYLINDER: -1.50
OS_SPHERE: +1.25
OD_SPHERE: +1.50

## 2024-01-29 ASSESSMENT — CONFRONTATIONAL VISUAL FIELD TEST (CVF)
OS_FINDINGS: FULL
OD_FINDINGS: FULL

## 2024-02-28 ENCOUNTER — OFFICE (OUTPATIENT)
Facility: LOCATION | Age: 74
Setting detail: OPHTHALMOLOGY
End: 2024-02-28
Payer: MEDICARE

## 2024-02-28 DIAGNOSIS — H25.13: ICD-10-CM

## 2024-02-28 DIAGNOSIS — H00.031: ICD-10-CM

## 2024-02-28 PROCEDURE — 92012 INTRM OPH EXAM EST PATIENT: CPT | Performed by: OPHTHALMOLOGY

## 2024-02-28 PROCEDURE — 92020 GONIOSCOPY: CPT | Performed by: OPHTHALMOLOGY

## 2024-02-28 ASSESSMENT — SPHEQUIV_DERIVED
OS_SPHEQUIV: 0.625
OD_SPHEQUIV: 0.75
OS_SPHEQUIV: 0.5
OD_SPHEQUIV: 0.5
OD_SPHEQUIV: 0.375
OS_SPHEQUIV: 0.5

## 2024-02-28 ASSESSMENT — REFRACTION_MANIFEST
OD_SPHERE: +1.00
OS_SPHERE: +1.00
OS_VA1: 20/25
OD_AXIS: 120
OD_ADD: +2.50
OS_ADD: +2.75
OD_CYLINDER: -0.75
OS_AXIS: 58
OS_AXIS: 65
OD_CYLINDER: -1.00
OD_VA1: 20/20
OS_CYLINDER: -1.00
OD_SPHERE: +0.75
OS_CYLINDER: -1.25
OD_ADD: +2.75
OS_ADD: +2.50
OS_VA1: 20/20
OS_SPHERE: +1.25
OD_AXIS: 150
OD_VA1: 20/20

## 2024-02-28 ASSESSMENT — REFRACTION_CURRENTRX
OS_ADD: +2.50
OS_AXIS: 75
OS_VPRISM_DIRECTION: PROGS
OS_OVR_VA: 20/
OD_OVR_VA: 20/
OS_CYLINDER: -1.25
OD_CYLINDER: -0.75
OD_ADD: +2.50
OD_VPRISM_DIRECTION: PROGS
OD_SPHERE: +1.75
OS_SPHERE: +2.25
OD_AXIS: 132

## 2024-02-28 ASSESSMENT — REFRACTION_AUTOREFRACTION
OS_AXIS: 067
OD_AXIS: 106
OS_SPHERE: +1.25
OD_SPHERE: +1.50
OS_CYLINDER: -1.50
OD_CYLINDER: -1.50

## 2024-02-28 ASSESSMENT — CONFRONTATIONAL VISUAL FIELD TEST (CVF)
OS_FINDINGS: FULL
OD_FINDINGS: FULL

## 2024-03-11 ENCOUNTER — APPOINTMENT (OUTPATIENT)
Dept: ORTHOPEDIC SURGERY | Facility: CLINIC | Age: 74
End: 2024-03-11
Payer: MEDICARE

## 2024-03-11 VITALS — BODY MASS INDEX: 24.16 KG/M2 | HEIGHT: 57 IN | WEIGHT: 112 LBS

## 2024-03-11 PROCEDURE — 99214 OFFICE O/P EST MOD 30 MIN: CPT

## 2024-03-11 PROCEDURE — 73562 X-RAY EXAM OF KNEE 3: CPT | Mod: RT

## 2024-03-11 PROCEDURE — 73503 X-RAY EXAM HIP UNI 4/> VIEWS: CPT | Mod: RT

## 2024-03-11 RX ORDER — HYALURONATE SODIUM 20 MG/2 ML
20 SYRINGE (ML) INTRAARTICULAR
Qty: 3 | Refills: 0 | Status: ACTIVE | COMMUNITY
Start: 2024-03-11 | End: 1900-01-01

## 2024-03-11 NOTE — ASSESSMENT
[FreeTextEntry1] : Previous doc: 7/18/23: Adv bone on bone arthritis. Has tried and failed all forms of conservative tx. Delayed surgery in the past due to other surgeries/family issues but is ready to proceed with R TKA with ALLEGRA- risks and benefits explained. 10/31/23: Pt with adv OA RT knee. Pt cancelled due to decrease pulses in leg- rescheduled for 12/20. 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 unsure if increased likelihood of interfering with healing postoperatively. I will speak with her vascular doctor and determine if she need any vascular procedures prior to her TKA.  For now we will keep her scheduled  3/11/24: Cont right knee pain - has extensive arterial disease but is not on any blood thinners and unsure why.  Unable to palpate distal pulses.  Discussed risks and benefits of TKA - I am very concerned about her vascular status and need to discuss this with her vascular surgeon.  I reviewed her previous vascular surgery note which stated she had a MARGARITA of 0  .57 with collateral blood flow.  He did however say that they could revascularize her retrograde which would require Plavix for 6 months.  Although she does have significant symptoms and OA she is able to tolerate the symptoms and walks without a cane or assistive device due to the risk of possible infection and amputation I feel since there exists a procedure that can help her I would delay TKA and plan for her to revascularize the leg.  I will start Euflexa and we will start PT for her moderate hip OA we might try a hip injection in the future.  This was explained in detail to her and her daughter they understand and agree with the plan and we will plan for TKA 6 months after her revascularization.

## 2024-03-11 NOTE — IMAGING
[Right] : right knee [AP] : anteroposterior [Lateral] : lateral [[place]] : skyline [advanced tricompartmental OA with medial compartment narrowing and varus alignment] : advanced tricompartmental OA with medial compartment narrowing and varus alignment [de-identified] : RIGHT KNEE -2 and flexes to 100 varus deformity only partially correctable distal leg/foot warm but no palpable pulse no edema medial joint line tenderness lateral joint line tenderness  rt hip  decreased ROM  + Impingement   [All Views] : anteroposterior, lateral [Moderate arthritis (Tonnis Grade 2)] : Moderate arthritis (Tonnis Grade 2)

## 2024-03-11 NOTE — DISCUSSION/SUMMARY
[de-identified] : We had an extensive discussion regarding surgical intervention including risk, benefits and alternatives.  The risks include, but are not limited to infection, bleeding, injury to small nerves and blood vessels, pain, stiffness, phlebitis, DVT malunion, nonunion, and need for secondary procedures.  Preoperative, intraoperative and postoperative care were discussed and outlined to the patient as well.  The patient has had an opportunity to ask any question and all were answered to the best of my ability.  The natural progression of Osteoarthritis was explained to the patient. We discussed the possible treatment options from conservative to operative. These included NSAIDS, Glucosamine and Chondrotin sulfate, and Physical Therapy as well different types of injections. We also discussed that at some point they may progress to needed a TKA.  Information and pamphlets were given when appropriate.  Patient Complains of pain in Knee with a level that often reaches greater than a 8/10. The Pain has been progressively worsening of his/her treatment coarse. The pain has interfered with their ADLs and worsens with weight bearing. On exam they often have episodes of swelling/effusion with limited ROM. Pain worsens with ROM passive and active and I can palpate crepitus. X-rays were reviewed with the patient and they show joint space narrowing, subchondral sclerosis, osteophyte formation, and subchondral cysts.  After a period of more than 12 weeks physical therapy or exercise program done with me or another treating physician they have continued pain. The patient has failed a trial of NSAID medication or pain relieves if they were unable to tolerate NSAID medications as well as a series of injection, steroid or Hyaluronic Acid. After a long discussion with the patient both the patient and I have decided we have exhausted all forms of less radical treatments and they would like to proceed with Total Knee Replacement  We discussed my findings and the natural history of their condition. We talked about the details of the proposed surgery and the recovery. We discussed the material risks, possible benefits and alternatives to surgery. The risks include but are not limited to infection, bleeding and possible need for blood transfusion, fracture, bowel blockage, bladder retention or infection, need for reoperation, stiffness and/or limited range of motion, possible damage to nerves and blood vessels, failure of fixation of components, risk of deep vein thromboses and pulmonary embolism, wound healing problems, dislocation, and possible leg length discrepancy. Although incredibly rare, we also discussed the risks of a cardiac event, stroke and even death during, or following, the surgery. We discussed the type of implants the patient will be receiving and the type of fixation that will be used, as well as whether a robot or computer navigation aide will be used. The patient understands they will need medical clearance and will attend a preoperative joint education class. We also discussed the type of anesthesia they will receive, and the risks associated with hospital or rehab length of stay, obesity, diabetes and smoking.  Entered by Gwendolyn Akers acting as a scribe.

## 2024-03-11 NOTE — HISTORY OF PRESENT ILLNESS
[de-identified] : 3/11/24: Cont knee pain, did not have surgery in December.  Not scheduled yet.  Has been seeing vascular surgeon - did not think treating occlusion was necessary but carries some risk with proceeding with surgery.  Not taking any blood thinners.  Previous doc: 7/18/23: Here to f/up RT Knee pain. Pain worsens with prolonged sitting and going from sit to stand. Not taking any medications for pain. Recieved csi inj in the past 2x, only provided relief the first time. Wears knee brace occasionally when pain is very bad. Had R TKA scheduled pre-COVID but was cancelled. Pt is interested in discussing TKA today. 10/31/23: Here to f/up RT knee- adv OA. Pt was unable to obtain clearance from her PCP for surgery- was told she had issues with blood flow in her LE. States she had angiogram done. Knee pain has worsened.   [FreeTextEntry5] : slight swelling. stated knee jamel while walking.

## 2024-03-13 ENCOUNTER — OFFICE (OUTPATIENT)
Facility: LOCATION | Age: 74
Setting detail: OPHTHALMOLOGY
End: 2024-03-13
Payer: MEDICARE

## 2024-03-13 DIAGNOSIS — H00.031: ICD-10-CM

## 2024-03-13 PROCEDURE — 99213 OFFICE O/P EST LOW 20 MIN: CPT | Performed by: OPHTHALMOLOGY

## 2024-03-13 ASSESSMENT — REFRACTION_CURRENTRX
OD_CYLINDER: -0.50
OS_SPHERE: +2.25
OS_OVR_VA: 20/
OS_CYLINDER: -1.50
OS_VPRISM_DIRECTION: PROGS
OS_AXIS: 069
OD_OVR_VA: 20/
OD_ADD: +2.75
OD_SPHERE: +2.00
OS_ADD: +3.00
OD_AXIS: 150
OD_VPRISM_DIRECTION: PROGS

## 2024-03-13 ASSESSMENT — REFRACTION_MANIFEST
OD_CYLINDER: -0.75
OS_ADD: +2.50
OS_CYLINDER: -1.00
OD_VA1: 20/20
OD_AXIS: 150
OS_AXIS: 65
OD_SPHERE: +1.00
OS_SPHERE: +1.25
OD_VA1: 20/20
OD_AXIS: 120
OS_CYLINDER: -1.25
OD_SPHERE: +0.75
OS_VA1: 20/20
OS_AXIS: 58
OD_ADD: +2.75
OS_ADD: +2.75
OS_VA1: 20/25
OD_CYLINDER: -1.00
OS_SPHERE: +1.00
OD_ADD: +2.50

## 2024-03-13 ASSESSMENT — SPHEQUIV_DERIVED
OS_SPHEQUIV: 0.625
OS_SPHEQUIV: 0.5
OD_SPHEQUIV: 0.375
OD_SPHEQUIV: 0.5

## 2024-04-16 ENCOUNTER — APPOINTMENT (OUTPATIENT)
Dept: ORTHOPEDIC SURGERY | Facility: CLINIC | Age: 74
End: 2024-04-16
Payer: MEDICARE

## 2024-04-16 VITALS — BODY MASS INDEX: 24.16 KG/M2 | HEIGHT: 57 IN | WEIGHT: 112 LBS

## 2024-04-16 PROCEDURE — 20611 DRAIN/INJ JOINT/BURSA W/US: CPT | Mod: RT

## 2024-04-16 PROCEDURE — 99212 OFFICE O/P EST SF 10 MIN: CPT | Mod: 25

## 2024-04-16 NOTE — ASSESSMENT
[FreeTextEntry1] : Previous doc: 7/18/23: Adv bone on bone arthritis. Has tried and failed all forms of conservative tx. Delayed surgery in the past due to other surgeries/family issues but is ready to proceed with R TKA with ALLEGRA- risks and benefits explained. 10/31/23: Pt with adv OA RT knee. Pt cancelled due to decrease pulses in leg- rescheduled for 12/20. 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 unsure if increased likelihood of interfering with healing postoperatively. I will speak with her vascular doctor and determine if she need any vascular procedures prior to her TKA.  For now we will keep her scheduled 3/11/24: Cont right knee pain - has extensive arterial disease but is not on any blood thinners and unsure why.  Unable to palpate distal pulses.  Discussed risks and benefits of TKA - I am very concerned about her vascular status and need to discuss this with her vascular surgeon.  I reviewed her previous vascular surgery note which stated she had a MARGARITA of 0  .57 with collateral blood flow.  He did however say that they could revascularize her retrograde which would require Plavix for 6 months.  Although she does have significant symptoms and OA she is able to tolerate the symptoms and walks without a cane or assistive device due to the risk of possible infection and amputation I feel since there exists a procedure that can help her I would delay TKA and plan for her to revascularize the leg.  I will start Euflexa and we will start PT for her moderate hip OA we might try a hip injection in the future.  This was explained in detail to her and her daughter they understand and agree with the plan and we will plan for TKA 6 months after her revascularization.  4/16/24: Euflexxa #1 RT knee done today- tolerated well. F/up 1 week.

## 2024-04-16 NOTE — HISTORY OF PRESENT ILLNESS
[Dull/Aching] : dull/aching [Throbbing] : throbbing [1] : 1 [Euflexxa] : Euflexxa [de-identified] : 4/16/24: Here to being Euflexxa series RT knee.   Previous doc: 7/18/23: Here to f/up RT Knee pain. Pain worsens with prolonged sitting and going from sit to stand. Not taking any medications for pain. Recieved csi inj in the past 2x, only provided relief the first time. Wears knee brace occasionally when pain is very bad. Had R TKA scheduled pre-COVID but was cancelled. Pt is interested in discussing TKA today. 10/31/23: Here to f/up RT knee- adv OA. Pt was unable to obtain clearance from her PCP for surgery- was told she had issues with blood flow in her LE. States she had angiogram done. Knee pain has worsened.   3/11/24: Cont knee pain, did not have surgery in December.  Not scheduled yet.  Has been seeing vascular surgeon - did not think treating occlusion was necessary but carries some risk with proceeding with surgery.  Not taking any blood thinners. [de-identified] : right knee  [de-identified] : euflexxa

## 2024-04-16 NOTE — IMAGING
[All Views] : anteroposterior, lateral [Moderate arthritis (Tonnis Grade 2)] : Moderate arthritis (Tonnis Grade 2) [Right] : right knee [AP] : anteroposterior [Lateral] : lateral [[place]] : skyline [advanced tricompartmental OA with medial compartment narrowing and varus alignment] : advanced tricompartmental OA with medial compartment narrowing and varus alignment [de-identified] : RIGHT KNEE -2 and flexes to 100 varus deformity only partially correctable distal leg/foot warm but no palpable pulse no edema medial joint line tenderness lateral joint line tenderness  rt hip  decreased ROM  + Impingement

## 2024-04-16 NOTE — DISCUSSION/SUMMARY
[de-identified] : The natural progression of Osteoarthritis was explained to the patient.  We discussed the possible treatment options from conservative to operative.  These included NSAIDS, Glucosamine and Chondrotin sulfate, and Physical Therapy as well different types of injections.  We also discussed that at some point they may progress to needed a TKA.  Information and pamphlets were given when appropriate.  The risks, benefits, contents and alternatives to injection were explained in full to the patient.  Risks outlined include but are not limited to infection, sepsis, bleeding, scarring, skin discoloration, temporary increase in pain, syncopal episode, failure to resolve symptoms, allergic reaction, flare reaction, permanent white skin discoloration, symptom recurrence, and elevation of blood sugar in diabetics.  Patient understood the risks.  All questions were answered.  After discussion of options, patient requested an injection.  Oral informed consent was obtained and sterile prep was done of the injection site.  Sterile technique was used to introduce the mixture.  Patient tolerated the procedure well.  Patient advised to ice the injection site this evening.  Signs and symptoms of infection reviewed and patient advised to call immediately for redness, fevers, and/or chills.  Entered by Gwendolyn Akers acting as a scribe.

## 2024-04-16 NOTE — PROCEDURE
[Large Joint Injection] : Large joint injection [Right] : of the right [Knee] : knee [Pain] : pain [Inflammation] : inflammation [X-ray evidence of Osteoarthritis on this or prior visit] : x-ray evidence of Osteoarthritis on this or prior visit [Alcohol] : alcohol [Betadine] : betadine [Ethyl Chloride sprayed topically] : ethyl chloride sprayed topically [Sterile technique used] : sterile technique used [Euflexxa(20mg)] : 20mg of Euflexxa [#1] : series #1 [] : Patient tolerated procedure well [Call if redness, pain or fever occur] : call if redness, pain or fever occur [Previous OTC use and PT nontherapeutic] : patient has tried OTC's including aspirin, Ibuprofen, Aleve, etc or prescription NSAIDS, and/or exercises at home and/or physical therapy without satisfactory response [Patient had decreased mobility in the joint] : patient had decreased mobility in the joint [Risks, benefits, alternatives discussed / Verbal consent obtained] : the risks benefits, and alternatives have been discussed, and verbal consent was obtained [All ultrasound images have been permanently captured and stored accordingly in our picture archiving and communication system] : All ultrasound images have been permanently captured and stored accordingly in our picture archiving and communication system [Visualization of the needle and placement of injection was performed without complication] : visualization of the needle and placement of injection was performed without complication

## 2024-04-19 ENCOUNTER — NON-APPOINTMENT (OUTPATIENT)
Age: 74
End: 2024-04-19

## 2024-04-19 ENCOUNTER — APPOINTMENT (OUTPATIENT)
Dept: ORTHOPEDIC SURGERY | Facility: CLINIC | Age: 74
End: 2024-04-19

## 2024-04-30 ENCOUNTER — APPOINTMENT (OUTPATIENT)
Dept: ORTHOPEDIC SURGERY | Facility: CLINIC | Age: 74
End: 2024-04-30
Payer: MEDICARE

## 2024-04-30 PROCEDURE — 20611 DRAIN/INJ JOINT/BURSA W/US: CPT | Mod: RT

## 2024-04-30 PROCEDURE — 99212 OFFICE O/P EST SF 10 MIN: CPT | Mod: 25

## 2024-04-30 NOTE — HISTORY OF PRESENT ILLNESS
[6] : 6 [5] : 5 [Dull/Aching] : dull/aching [Localized] : localized [Sharp] : sharp [Shooting] : shooting [Intermittent] : intermittent [1] : 1 [Euflexxa] : Euflexxa [de-identified] : 4/30/24: Here for Euflexxa #2 R knee   Previous doc: 7/18/23: Here to f/up RT Knee pain. Pain worsens with prolonged sitting and going from sit to stand. Not taking any medications for pain. Recieved csi inj in the past 2x, only provided relief the first time. Wears knee brace occasionally when pain is very bad. Had R TKA scheduled pre-COVID but was cancelled. Pt is interested in discussing TKA today. 10/31/23: Here to f/up RT knee- adv OA. Pt was unable to obtain clearance from her PCP for surgery- was told she had issues with blood flow in her LE. States she had angiogram done. Knee pain has worsened.   3/11/24: Cont knee pain, did not have surgery in December.  Not scheduled yet.  Has been seeing vascular surgeon - did not think treating occlusion was necessary but carries some risk with proceeding with surgery.  Not taking any blood thinners. 4/16/24: Here to being Euflexxa series RT knee.  [] : no [FreeTextEntry5] : pt s here for injection euflexxa #2 , states ankle has swollen , knee pain has increased for 3 days now. pain at night worsened.  [de-identified] : right knee  [de-identified] : euflexxa

## 2024-04-30 NOTE — ASSESSMENT
[FreeTextEntry1] : Previous doc: 7/18/23: Adv bone on bone arthritis. Has tried and failed all forms of conservative tx. Delayed surgery in the past due to other surgeries/family issues but is ready to proceed with R TKA with ALLEGRA- risks and benefits explained. 10/31/23: Pt with adv OA RT knee. Pt cancelled due to decrease pulses in leg- rescheduled for 12/20. 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 unsure if increased likelihood of interfering with healing postoperatively. I will speak with her vascular doctor and determine if she need any vascular procedures prior to her TKA.  For now we will keep her scheduled 3/11/24: Cont right knee pain - has extensive arterial disease but is not on any blood thinners and unsure why.  Unable to palpate distal pulses.  Discussed risks and benefits of TKA - I am very concerned about her vascular status and need to discuss this with her vascular surgeon.  I reviewed her previous vascular surgery note which stated she had a MARGARITA of 0  .57 with collateral blood flow.  He did however say that they could revascularize her retrograde which would require Plavix for 6 months.  Although she does have significant symptoms and OA she is able to tolerate the symptoms and walks without a cane or assistive device due to the risk of possible infection and amputation I feel since there exists a procedure that can help her I would delay TKA and plan for her to revascularize the leg.  I will start Euflexa and we will start PT for her moderate hip OA we might try a hip injection in the future.  This was explained in detail to her and her daughter they understand and agree with the plan and we will plan for TKA 6 months after her revascularization. 4/16/24: Euflexxa #1 RT knee done today- tolerated well. F/up 1 week.  4/30/24: Euflexxa #2 RT knee done today- tolerated well. F/up 1 week.

## 2024-04-30 NOTE — PROCEDURE
[Large Joint Injection] : Large joint injection [Right] : of the right [Knee] : knee [Pain] : pain [Inflammation] : inflammation [X-ray evidence of Osteoarthritis on this or prior visit] : x-ray evidence of Osteoarthritis on this or prior visit [Alcohol] : alcohol [Betadine] : betadine [Ethyl Chloride sprayed topically] : ethyl chloride sprayed topically [Sterile technique used] : sterile technique used [Euflexxa(20mg)] : 20mg of Euflexxa [#2] : series #2 [] : Patient tolerated procedure well [Call if redness, pain or fever occur] : call if redness, pain or fever occur [Previous OTC use and PT nontherapeutic] : patient has tried OTC's including aspirin, Ibuprofen, Aleve, etc or prescription NSAIDS, and/or exercises at home and/or physical therapy without satisfactory response [Patient had decreased mobility in the joint] : patient had decreased mobility in the joint [Risks, benefits, alternatives discussed / Verbal consent obtained] : the risks benefits, and alternatives have been discussed, and verbal consent was obtained [All ultrasound images have been permanently captured and stored accordingly in our picture archiving and communication system] : All ultrasound images have been permanently captured and stored accordingly in our picture archiving and communication system [Visualization of the needle and placement of injection was performed without complication] : visualization of the needle and placement of injection was performed without complication

## 2024-04-30 NOTE — IMAGING
[All Views] : anteroposterior, lateral [Moderate arthritis (Tonnis Grade 2)] : Moderate arthritis (Tonnis Grade 2) [Right] : right knee [AP] : anteroposterior [Lateral] : lateral [[place]] : skyline [advanced tricompartmental OA with medial compartment narrowing and varus alignment] : advanced tricompartmental OA with medial compartment narrowing and varus alignment [de-identified] : RIGHT KNEE -2 and flexes to 100 varus deformity only partially correctable distal leg/foot warm but no palpable pulse no edema medial joint line tenderness lateral joint line tenderness  rt hip  decreased ROM  + Impingement

## 2024-05-07 ENCOUNTER — APPOINTMENT (OUTPATIENT)
Dept: ORTHOPEDIC SURGERY | Facility: CLINIC | Age: 74
End: 2024-05-07
Payer: MEDICARE

## 2024-05-07 VITALS — BODY MASS INDEX: 24.16 KG/M2 | HEIGHT: 57 IN | WEIGHT: 112 LBS

## 2024-05-07 PROCEDURE — 99212 OFFICE O/P EST SF 10 MIN: CPT | Mod: 25

## 2024-05-07 PROCEDURE — 20611 DRAIN/INJ JOINT/BURSA W/US: CPT

## 2024-05-07 NOTE — DISCUSSION/SUMMARY
[de-identified] : The natural progression of Osteoarthritis was explained to the patient.  We discussed the possible treatment options from conservative to operative.  These included NSAIDS, Glucosamine and Chondrotin sulfate, and Physical Therapy as well different types of injections.  We also discussed that at some point they may progress to needed a TKA.  Information and pamphlets were given when appropriate.  The risks, benefits, contents and alternatives to injection were explained in full to the patient.  Risks outlined include but are not limited to infection, sepsis, bleeding, scarring, skin discoloration, temporary increase in pain, syncopal episode, failure to resolve symptoms, allergic reaction, flare reaction, permanent white skin discoloration, symptom recurrence, and elevation of blood sugar in diabetics.  Patient understood the risks.  All questions were answered.  After discussion of options, patient requested an injection.  Oral informed consent was obtained and sterile prep was done of the injection site.  Sterile technique was used to introduce the mixture.  Patient tolerated the procedure well.  Patient advised to ice the injection site this evening.  Signs and symptoms of infection reviewed and patient advised to call immediately for redness, fevers, and/or chills.  Progress note completed by Moraima Abel PA-C.

## 2024-05-07 NOTE — PROCEDURE
[Large Joint Injection] : Large joint injection [Right] : of the right [Knee] : knee [Pain] : pain [Inflammation] : inflammation [X-ray evidence of Osteoarthritis on this or prior visit] : x-ray evidence of Osteoarthritis on this or prior visit [Alcohol] : alcohol [Betadine] : betadine [Ethyl Chloride sprayed topically] : ethyl chloride sprayed topically [Sterile technique used] : sterile technique used [Euflexxa(20mg)] : 20mg of Euflexxa [] : Patient tolerated procedure well [Call if redness, pain or fever occur] : call if redness, pain or fever occur [Previous OTC use and PT nontherapeutic] : patient has tried OTC's including aspirin, Ibuprofen, Aleve, etc or prescription NSAIDS, and/or exercises at home and/or physical therapy without satisfactory response [Patient had decreased mobility in the joint] : patient had decreased mobility in the joint [Risks, benefits, alternatives discussed / Verbal consent obtained] : the risks benefits, and alternatives have been discussed, and verbal consent was obtained [All ultrasound images have been permanently captured and stored accordingly in our picture archiving and communication system] : All ultrasound images have been permanently captured and stored accordingly in our picture archiving and communication system [Visualization of the needle and placement of injection was performed without complication] : visualization of the needle and placement of injection was performed without complication [#3] : series #3

## 2024-05-07 NOTE — ASSESSMENT
[FreeTextEntry1] : Previous doc: 7/18/23: Adv bone on bone arthritis. Has tried and failed all forms of conservative tx. Delayed surgery in the past due to other surgeries/family issues but is ready to proceed with R TKA with ALLEGRA- risks and benefits explained. 10/31/23: Pt with adv OA RT knee. Pt cancelled due to decrease pulses in leg- rescheduled for 12/20. 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 unsure if increased likelihood of interfering with healing postoperatively. I will speak with her vascular doctor and determine if she need any vascular procedures prior to her TKA.  For now we will keep her scheduled 3/11/24: Cont right knee pain - has extensive arterial disease but is not on any blood thinners and unsure why.  Unable to palpate distal pulses.  Discussed risks and benefits of TKA - I am very concerned about her vascular status and need to discuss this with her vascular surgeon.  I reviewed her previous vascular surgery note which stated she had a MARGARITA of 0  .57 with collateral blood flow.  He did however say that they could revascularize her retrograde which would require Plavix for 6 months.  Although she does have significant symptoms and OA she is able to tolerate the symptoms and walks without a cane or assistive device due to the risk of possible infection and amputation I feel since there exists a procedure that can help her I would delay TKA and plan for her to revascularize the leg.  I will start Euflexa and we will start PT for her moderate hip OA we might try a hip injection in the future.  This was explained in detail to her and her daughter they understand and agree with the plan and we will plan for TKA 6 months after her revascularization. 4/16/24: Euflexxa #1 RT knee done today- tolerated well. F/up 1 week. 4/30/24: Euflexxa #2 RT knee done today- tolerated well. F/up 1 week.  5/7/24: Euflexxa #3 RT knee done today- tolerated well. No sig relief yet with the series. f/up 6 weeks

## 2024-05-07 NOTE — HISTORY OF PRESENT ILLNESS
[5] : 5 [Dull/Aching] : dull/aching [Localized] : localized [Sharp] : sharp [Shooting] : shooting [Intermittent] : intermittent [1] : 1 [Euflexxa] : Euflexxa [de-identified] : 5/7/24: Here for Euflexxa #3 R knee   Previous doc: 7/18/23: Here to f/up RT Knee pain. Pain worsens with prolonged sitting and going from sit to stand. Not taking any medications for pain. Recieved csi inj in the past 2x, only provided relief the first time. Wears knee brace occasionally when pain is very bad. Had R TKA scheduled pre-COVID but was cancelled. Pt is interested in discussing TKA today. 10/31/23: Here to f/up RT knee- adv OA. Pt was unable to obtain clearance from her PCP for surgery- was told she had issues with blood flow in her LE. States she had angiogram done. Knee pain has worsened.   3/11/24: Cont knee pain, did not have surgery in December.  Not scheduled yet.  Has been seeing vascular surgeon - did not think treating occlusion was necessary but carries some risk with proceeding with surgery.  Not taking any blood thinners. 4/16/24: Here to being Euflexxa series RT knee.  4/30/24: Here for Euflexxa #2 R knee  [] : no [FreeTextEntry5] : pt s here for injection euflexxa #3 ,  [de-identified] : right knee  [de-identified] : euflexxa

## 2024-05-07 NOTE — IMAGING
[All Views] : anteroposterior, lateral [Moderate arthritis ([place] Grade 2)] : Moderate arthritis ([place] Grade 2) [Right] : right knee [AP] : anteroposterior [Lateral] : lateral [[place]] : skyline [advanced tricompartmental OA with medial compartment narrowing and varus alignment] : advanced tricompartmental OA with medial compartment narrowing and varus alignment [de-identified] : RIGHT KNEE -2 and flexes to 100 varus deformity only partially correctable distal leg/foot warm but no palpable pulse no edema medial joint line tenderness lateral joint line tenderness  rt hip  decreased ROM  + Impingement

## 2024-05-13 ENCOUNTER — OFFICE (OUTPATIENT)
Facility: LOCATION | Age: 74
Setting detail: OPHTHALMOLOGY
End: 2024-05-13
Payer: MEDICARE

## 2024-05-13 DIAGNOSIS — H25.13: ICD-10-CM

## 2024-05-13 PROCEDURE — 92014 COMPRE OPH EXAM EST PT 1/>: CPT | Performed by: OPHTHALMOLOGY

## 2024-06-18 ENCOUNTER — APPOINTMENT (OUTPATIENT)
Dept: ORTHOPEDIC SURGERY | Facility: CLINIC | Age: 74
End: 2024-06-18
Payer: MEDICARE

## 2024-06-18 VITALS — WEIGHT: 112 LBS | HEIGHT: 57 IN | BODY MASS INDEX: 24.16 KG/M2

## 2024-06-18 DIAGNOSIS — M17.11 UNILATERAL PRIMARY OSTEOARTHRITIS, RIGHT KNEE: ICD-10-CM

## 2024-06-18 PROCEDURE — 99215 OFFICE O/P EST HI 40 MIN: CPT

## 2024-06-18 NOTE — DISCUSSION/SUMMARY
[de-identified] : The natural progression of Osteoarthritis was explained to the patient.  We discussed the possible treatment options from conservative to operative.  These included NSAIDS, Glucosamine and Chondrotin sulfate, and Physical Therapy as well different types of injections.  We also discussed that at some point they may progress to needed a TKA.  Information and pamphlets were given when appropriate.  The risks, benefits, contents and alternatives to injection were explained in full to the patient.  Risks outlined include but are not limited to infection, sepsis, bleeding, scarring, skin discoloration, temporary increase in pain, syncopal episode, failure to resolve symptoms, allergic reaction, flare reaction, permanent white skin discoloration, symptom recurrence, and elevation of blood sugar in diabetics.  Patient understood the risks.  All questions were answered.  After discussion of options, patient requested an injection.  Oral informed consent was obtained and sterile prep was done of the injection site.  Sterile technique was used to introduce the mixture.  Patient tolerated the procedure well.  Patient advised to ice the injection site this evening.  Signs and symptoms of infection reviewed and patient advised to call immediately for redness, fevers, and/or chills.  Progress note completed by Moraima Abel PA-C.

## 2024-06-18 NOTE — ASSESSMENT
[FreeTextEntry1] : Previous doc: 7/18/23: Adv bone on bone arthritis. Has tried and failed all forms of conservative tx. Delayed surgery in the past due to other surgeries/family issues but is ready to proceed with R TKA with ALLEGRA- risks and benefits explained. 10/31/23: Pt with adv OA RT knee. Pt cancelled due to decrease pulses in leg- rescheduled for 12/20. 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 unsure if increased likelihood of interfering with healing postoperatively. I will speak with her vascular doctor and determine if she need any vascular procedures prior to her TKA.  For now we will keep her scheduled 3/11/24: Cont right knee pain - has extensive arterial disease but is not on any blood thinners and unsure why.  Unable to palpate distal pulses.  Discussed risks and benefits of TKA - I am very concerned about her vascular status and need to discuss this with her vascular surgeon.  I reviewed her previous vascular surgery note which stated she had a MARGARITA of 0  .57 with collateral blood flow.  He did however say that they could revascularize her retrograde which would require Plavix for 6 months.  Although she does have significant symptoms and OA she is able to tolerate the symptoms and walks without a cane or assistive device due to the risk of possible infection and amputation I feel since there exists a procedure that can help her I would delay TKA and plan for her to revascularize the leg.  I will start Euflexa and we will start PT for her moderate hip OA we might try a hip injection in the future.  This was explained in detail to her and her daughter they understand and agree with the plan and we will plan for TKA 6 months after her revascularization. 4/16/24: Euflexxa #1 RT knee done today- tolerated well. F/up 1 week. 4/30/24: Euflexxa #2 RT knee done today- tolerated well. F/up 1 week. 5/7/24: Euflexxa #3 RT knee done today- tolerated well. No sig relief yet with the series. f/up 6 weeks   6/18/24: Adv R knee OA with worsening right knee pain. No relief with the Euflexxa series finished last visit. She is seeing Dr. Jones (Vascular surgeon) in 2 weeks- consulting for stenting vs. conservative tx. Once her vascular issues are addressed and evaluated, we will plan for TKA in the future.

## 2024-06-18 NOTE — HISTORY OF PRESENT ILLNESS
[7] : 7 [de-identified] : 6/18/24: f/up R knee. States the Euflexxa series did not provide any relief. She states she has an artery blockage in the RLE- seeing Dr. Jones for this issue in 2 weeks   Previous doc: 7/18/23: Here to f/up RT Knee pain. Pain worsens with prolonged sitting and going from sit to stand. Not taking any medications for pain. Recieved csi inj in the past 2x, only provided relief the first time. Wears knee brace occasionally when pain is very bad. Had R TKA scheduled pre-COVID but was cancelled. Pt is interested in discussing TKA today. 10/31/23: Here to f/up RT knee- adv OA. Pt was unable to obtain clearance from her PCP for surgery- was told she had issues with blood flow in her LE. States she had angiogram done. Knee pain has worsened.   3/11/24: Cont knee pain, did not have surgery in December.  Not scheduled yet.  Has been seeing vascular surgeon - did not think treating occlusion was necessary but carries some risk with proceeding with surgery.  Not taking any blood thinners. 4/16/24: Here to being Euflexxa series RT knee.  4/30/24: Here for Euflexxa #2 R knee  5/7/24: Here for Euflexxa #3 R knee  [] : no [FreeTextEntry5] : pain increases when walking. having medial pain.

## 2024-06-18 NOTE — IMAGING
[de-identified] : RIGHT KNEE -2 and flexes to 100 varus deformity only partially correctable distal leg/foot warm but no palpable pulse no edema medial joint line tenderness lateral joint line tenderness  rt hip  decreased ROM  + Impingement

## 2024-08-02 ENCOUNTER — APPOINTMENT (OUTPATIENT)
Dept: ORTHOPEDIC SURGERY | Facility: CLINIC | Age: 74
End: 2024-08-02

## 2024-10-18 NOTE — REASON FOR VISIT
Pt was called and advised she needed a visit. Pt upset about that, stating she was just in clinic recently. Pt stated she does not drive and asked if a phone visit would be ok for this.     Routed to provider.    Brooke Alexander/EMT-B  [facility] / [place]     [FreeTextEntry2] : injection #1 right knee
